# Patient Record
Sex: MALE | URBAN - METROPOLITAN AREA
[De-identification: names, ages, dates, MRNs, and addresses within clinical notes are randomized per-mention and may not be internally consistent; named-entity substitution may affect disease eponyms.]

---

## 2024-06-10 DIAGNOSIS — K22.0 ACHALASIA OF CARDIA: Primary | ICD-10-CM

## 2024-06-11 ENCOUNTER — TELEPHONE (OUTPATIENT)
Dept: GASTROENTEROLOGY | Facility: CLINIC | Age: 34
End: 2024-06-11

## 2024-06-12 NOTE — TELEPHONE ENCOUNTER
Referral only states achalasia. Please sent last OV note and any supporting documentation for review by me before scheduling OV.  NBP